# Patient Record
Sex: MALE | Race: OTHER
[De-identification: names, ages, dates, MRNs, and addresses within clinical notes are randomized per-mention and may not be internally consistent; named-entity substitution may affect disease eponyms.]

---

## 2018-01-21 ENCOUNTER — HOSPITAL ENCOUNTER (EMERGENCY)
Dept: HOSPITAL 72 - EMR | Age: 33
Discharge: HOME | End: 2018-01-21
Payer: SELF-PAY

## 2018-01-21 VITALS — WEIGHT: 150 LBS | BODY MASS INDEX: 28.32 KG/M2 | HEIGHT: 61 IN

## 2018-01-21 VITALS — SYSTOLIC BLOOD PRESSURE: 122 MMHG | DIASTOLIC BLOOD PRESSURE: 71 MMHG

## 2018-01-21 VITALS — DIASTOLIC BLOOD PRESSURE: 88 MMHG | SYSTOLIC BLOOD PRESSURE: 132 MMHG

## 2018-01-21 DIAGNOSIS — F12.90: ICD-10-CM

## 2018-01-21 DIAGNOSIS — R20.2: Primary | ICD-10-CM

## 2018-01-21 PROCEDURE — 99283 EMERGENCY DEPT VISIT LOW MDM: CPT

## 2018-01-21 PROCEDURE — 93005 ELECTROCARDIOGRAM TRACING: CPT

## 2018-01-21 NOTE — EMERGENCY ROOM REPORT
History of Present Illness


General


Chief Complaint:  General Complaint


Source:  Patient


 (Corbin Madrid)





Present Illness


HPI


31 yo male patient presents to ER complaining of tingling in his left arm since 

this morning. 


Patient reports that his arm feels like "it feel asleep".


Patient states sensation in arm did not wake him up from sleep. Denies history 

of trauma to arm.


Patient states that he smoked weed with family member earlier in the day to 

treat symptoms. Reports marijuana did not alleviate symptoms and became 

concerned that it could be more serious condition so decided to report to ER.


Patient reports symptoms are still present but have lessened.


Patient reports a history of anxiety and stress related to money and paying 

rent.


Patient denies fever, nausea, vomiting, chest pain, SOB


 (Corbin Madrid)


Allergies:  


Coded Allergies:  


     No Known Allergies (Unverified , 1/21/18)





Patient History


Past Medical History:  see triage record


Pertinent Family History:  none


Social History:  Reports: drug use - marijuana


Reviewed Nursing Documentation:  PMH: Agreed, PSxH: Agreed (Corbin Madrid)





Nursing Documentation-PMH


Past Medical History:  No Stated History


 (Corbin Madrid)





Review of Systems


All Other Systems:  negative except mentioned in HPI


 (Corbin Madrid)





Physical Exam





Vital Signs








  Date Time  Temp Pulse Resp B/P (MAP) Pulse Ox O2 Delivery O2 Flow Rate FiO2


 


1/21/18 15:43 98.1 82 18 140/94 99 Room Air  








Sp02 EP Interpretation:  reviewed, normal


General Appearance:  no apparent distress, alert, GCS 15, non-toxic


Head:  normocephalic, atraumatic


Eyes:  bilateral eye normal inspection, bilateral eye PERRL


ENT:  hearing grossly normal, normal pharynx, no angioedema, normal voice


Respiratory:  chest non-tender, lungs clear, normal breath sounds, no 

respiratory distress, no accessory muscle use, speaking full sentences


Cardiovascular #1:  regular rate, rhythm, no edema


Cardiovascular #2:  2+ carotid (R), 2+ carotid (L), 2+ radial (R), 2+ radial (L)


Musculoskeletal:  back normal, gait/station normal, normal range of motion, non-

tender, calf tenderness, other - NVI, "tingling sensation" with elbow extension


Neurologic:  alert, oriented x3, responsive, motor strength/tone normal, 

sensory intact, speech normal


Psychiatric:  mood/affect normal


Skin:  normal color, no rash, warm/dry, well hydrated


Lymphatic:  no adenopathy


 (Corbin Madrid)





Medical Decision Making


PA Attestation


Dr. Benitez is my supervising physician with whom patient management has been 

discussed with.


 (Corbin Madrid)


Diagnostic Impression:  


 Primary Impression:  


 Tingling of left upper extremity


ER Course


Pt. presents to the ED c/o left arm tingling.





Ddx considered but are not limited to nerve irritation, strain, sprain, cardiac 

arrhythmia.





Vital signs: are WNL, pt. is afebrile.





ORDERS: 


EKG ordered, shows no acute ST segment elevation or depression. 





ED INTERVENTIONS: None required at this time.





DISCHARGE:


-Rx provided for Ibuprofen for pain symptoms.





At this time pt. is stable for d/c to home. Patient reports symptom improvement 

since onset.


Will provide printed patient care instructions, and any necessary prescriptions.


Patient instructed to follow with primary care provider in 3 - 5 days and to 

discuss further cardiac follow-up.


Care plan and follow up instructions have been discussed with the patient prior 

to discharge.


Take medications as directed. 


Patient questions asked and answered.


ER precautions given, patient instructed to return to ER immediately for any 

new or worsening of symptoms.


 (Corbin Madrid)





EKG Diagnostic Results


EKG Time:  16:13


Rate:  normal


Rhythm:  NSR


ST Segments:  no acute changes


Other Impression


Normal sinus rhythm with sinus arrhythmia


Rightward axis


Borderline ECG


PA Scribe Text


Andres Madrid PA-C


 (Corbin Madrid P.A.)





Rhythm Strip Diag. Results


Rhythm Strip Time:  16:13


EP Interpretation:  yes


Rate:  72


Rhythm:  NSR, no PVC's, no ectopy


PA Scribe Text


Andres Madrid PA-C


 (Corbin Madrid P.A.)





Last Vital Signs








  Date Time  Temp Pulse Resp B/P (MAP) Pulse Ox O2 Delivery O2 Flow Rate FiO2


 


1/21/18 15:43 98.1 82 18 140/94 99 Room Air  








 (Corbin Madrid P.A.)





Last Vital Signs








  Date Time  Temp Pulse Resp B/P (MAP) Pulse Ox O2 Delivery O2 Flow Rate FiO2


 


1/21/18 17:31 98.1 75 18 122/71 100 Room Air  








Status:  improved


 (Scooter Benitez M.D.)


Disposition:  HOME, SELF-CARE


Condition:  Stable


Scripts


Ibuprofen* (MOTRIN*) 600 Mg Tablet


600 MG ORAL Q8H Y for For Pain, #30 TAB 0 Refills


   Prov: Corbin Madrid         1/21/18


Patient Instructions:  Angina Pectoris, Easy-to-Read





Additional Instructions:  


Followup with primary care provider in 3 -5 days. Discuss further workup and 

evaluation for arm tingling and complete physical.


Provided with list of clinics to establish care.


Patient questions asked and answered.


ER precautions given, patient instructed to return to ER immediately for any 

new or worsening of symptoms including but not limited to fever, chest pain, 

SOB.











Corbin Madrid Jan 21, 2018 15:51


Scooter Benitez M.D. Jan 26, 2018 10:19

## 2018-02-05 NOTE — CARDIOLOGY REPORT
--------------- APPROVED REPORT --------------





EKG Measurement

Heart [id number]

ND [id number]

[id number]

[id number]

[id number]





Normal sinus rhythm with sinus arrhythmia

Rightward axis

Borderline ECG